# Patient Record
Sex: FEMALE | Race: WHITE | NOT HISPANIC OR LATINO | Employment: FULL TIME | URBAN - METROPOLITAN AREA
[De-identification: names, ages, dates, MRNs, and addresses within clinical notes are randomized per-mention and may not be internally consistent; named-entity substitution may affect disease eponyms.]

---

## 2017-07-14 ENCOUNTER — HOSPITAL ENCOUNTER (OUTPATIENT)
Dept: FAMILY MEDICINE CLINIC | Facility: CLINIC | Age: 42
Setting detail: SPECIMEN
Discharge: HOME | End: 2017-07-14
Attending: FAMILY MEDICINE | Admitting: FAMILY MEDICINE

## 2017-07-14 LAB
ALBUMIN SERPL-MCNC: 4.1 G/DL (ref 3.5–4.8)
ALBUMIN/GLOB SERPL: 1.6 {RATIO} (ref 1–1.7)
ALP SERPL-CCNC: 47 IU/L (ref 32–91)
ALT SERPL-CCNC: 15 IU/L (ref 14–54)
ANION GAP SERPL CALC-SCNC: 13.1 MMOL/L (ref 10–20)
AST SERPL-CCNC: 18 IU/L (ref 15–41)
BILIRUB SERPL-MCNC: 1.4 MG/DL (ref 0.3–1.2)
BUN SERPL-MCNC: 11 MG/DL (ref 8–20)
BUN/CREAT SERPL: 13.8 (ref 5.4–26.2)
CALCIUM SERPL-MCNC: 9 MG/DL (ref 8.9–10.3)
CHLORIDE SERPL-SCNC: 103 MMOL/L (ref 101–111)
CHOLEST SERPL-MCNC: 217 MG/DL
CHOLEST/HDLC SERPL: 2.5 {RATIO}
CONV CO2: 27 MMOL/L (ref 22–32)
CONV LDL CHOLESTEROL DIRECT: 121 MG/DL (ref 0–100)
CONV TOTAL PROTEIN: 6.7 G/DL (ref 6.1–7.9)
CREAT UR-MCNC: 0.8 MG/DL (ref 0.4–1)
GLOBULIN UR ELPH-MCNC: 2.6 G/DL (ref 2.5–3.8)
GLUCOSE SERPL-MCNC: 84 MG/DL (ref 65–99)
HDLC SERPL-MCNC: 88 MG/DL
LDLC/HDLC SERPL: 1.4 {RATIO}
LIPID INTERPRETATION: ABNORMAL
POTASSIUM SERPL-SCNC: 4.1 MMOL/L (ref 3.6–5.1)
SODIUM SERPL-SCNC: 139 MMOL/L (ref 136–144)
TRIGL SERPL-MCNC: 71 MG/DL
VLDLC SERPL CALC-MCNC: 7.9 MG/DL

## 2019-08-05 ENCOUNTER — OFFICE VISIT (OUTPATIENT)
Dept: FAMILY MEDICINE CLINIC | Facility: CLINIC | Age: 44
End: 2019-08-05

## 2019-08-05 VITALS
SYSTOLIC BLOOD PRESSURE: 109 MMHG | OXYGEN SATURATION: 100 % | DIASTOLIC BLOOD PRESSURE: 70 MMHG | BODY MASS INDEX: 21.97 KG/M2 | HEART RATE: 86 BPM | HEIGHT: 63 IN | WEIGHT: 124 LBS | TEMPERATURE: 97.6 F

## 2019-08-05 DIAGNOSIS — N91.5 OLIGOMENORRHEA, UNSPECIFIED TYPE: ICD-10-CM

## 2019-08-05 DIAGNOSIS — Z00.00 ENCOUNTER FOR GENERAL ADULT MEDICAL EXAMINATION WITHOUT ABNORMAL FINDINGS: Primary | ICD-10-CM

## 2019-08-05 DIAGNOSIS — Z12.39 SCREENING FOR BREAST CANCER: ICD-10-CM

## 2019-08-05 LAB
ALBUMIN SERPL-MCNC: 3.7 G/DL (ref 3.5–4.8)
ALBUMIN/GLOB SERPL: 1.5 G/DL (ref 1–1.7)
ALP SERPL-CCNC: 54 U/L (ref 32–91)
ALT SERPL W P-5'-P-CCNC: 12 U/L (ref 14–54)
ANION GAP SERPL CALCULATED.3IONS-SCNC: 10.9 MMOL/L (ref 5–15)
ARTICHOKE IGE QN: 136 MG/DL (ref 0–100)
AST SERPL-CCNC: 15 U/L (ref 15–41)
BASOPHILS # BLD AUTO: 0 10*3/MM3 (ref 0–0.2)
BASOPHILS NFR BLD AUTO: 0.4 % (ref 0–1.5)
BILIRUB SERPL-MCNC: 0.7 MG/DL (ref 0.3–1.2)
BUN BLD-MCNC: 14 MG/DL (ref 8–20)
BUN/CREAT SERPL: 20 (ref 5.4–26.2)
CALCIUM SPEC-SCNC: 8.6 MG/DL (ref 8.9–10.3)
CHLORIDE SERPL-SCNC: 108 MMOL/L (ref 101–111)
CHOLEST SERPL-MCNC: 215 MG/DL
CO2 SERPL-SCNC: 26 MMOL/L (ref 22–32)
CREAT BLD-MCNC: 0.7 MG/DL (ref 0.4–1)
DEPRECATED RDW RBC AUTO: 50.3 FL (ref 37–54)
EOSINOPHIL # BLD AUTO: 0.1 10*3/MM3 (ref 0–0.4)
EOSINOPHIL NFR BLD AUTO: 2.4 % (ref 0.3–6.2)
ERYTHROCYTE [DISTWIDTH] IN BLOOD BY AUTOMATED COUNT: 14.7 % (ref 12.3–15.4)
FSH SERPL-ACNC: 7.02 MIU/ML
GFR SERPL CREATININE-BSD FRML MDRD: 91 ML/MIN/1.73
GLOBULIN UR ELPH-MCNC: 2.4 GM/DL (ref 2.5–3.8)
GLUCOSE BLD-MCNC: 83 MG/DL (ref 65–99)
HCT VFR BLD AUTO: 35.8 % (ref 34–46.6)
HDLC SERPL QL: 2.95
HDLC SERPL-MCNC: 73 MG/DL
HGB BLD-MCNC: 12 G/DL (ref 12–15.9)
LDLC/HDLC SERPL: 1.77 {RATIO}
LYMPHOCYTES # BLD AUTO: 0.9 10*3/MM3 (ref 0.7–3.1)
LYMPHOCYTES NFR BLD AUTO: 20.7 % (ref 19.6–45.3)
MCH RBC QN AUTO: 31.9 PG (ref 26.6–33)
MCHC RBC AUTO-ENTMCNC: 33.4 G/DL (ref 31.5–35.7)
MCV RBC AUTO: 95.6 FL (ref 79–97)
MONOCYTES # BLD AUTO: 0.4 10*3/MM3 (ref 0.1–0.9)
MONOCYTES NFR BLD AUTO: 10 % (ref 5–12)
NEUTROPHILS # BLD AUTO: 3 10*3/MM3 (ref 1.7–7)
NEUTROPHILS NFR BLD AUTO: 66.5 % (ref 42.7–76)
NRBC BLD AUTO-RTO: 0.1 /100 WBC (ref 0–0.2)
PLATELET # BLD AUTO: 237 10*3/MM3 (ref 140–450)
PMV BLD AUTO: 7.7 FL (ref 6–12)
POTASSIUM BLD-SCNC: 4.9 MMOL/L (ref 3.6–5.1)
PROT SERPL-MCNC: 6.1 G/DL (ref 6.1–7.9)
RBC # BLD AUTO: 3.75 10*6/MM3 (ref 3.77–5.28)
SODIUM BLD-SCNC: 140 MMOL/L (ref 136–144)
TRIGL SERPL-MCNC: 64 MG/DL
TSH SERPL DL<=0.05 MIU/L-ACNC: 1.34 MIU/ML (ref 0.34–5.6)
VLDLC SERPL-MCNC: 12.8 MG/DL
WBC NRBC COR # BLD: 4.5 10*3/MM3 (ref 3.4–10.8)

## 2019-08-05 PROCEDURE — 80061 LIPID PANEL: CPT | Performed by: FAMILY MEDICINE

## 2019-08-05 PROCEDURE — 80053 COMPREHEN METABOLIC PANEL: CPT | Performed by: FAMILY MEDICINE

## 2019-08-05 PROCEDURE — 84443 ASSAY THYROID STIM HORMONE: CPT | Performed by: FAMILY MEDICINE

## 2019-08-05 PROCEDURE — 99396 PREV VISIT EST AGE 40-64: CPT | Performed by: FAMILY MEDICINE

## 2019-08-05 PROCEDURE — 83001 ASSAY OF GONADOTROPIN (FSH): CPT | Performed by: FAMILY MEDICINE

## 2019-08-05 PROCEDURE — 87624 HPV HI-RISK TYP POOLED RSLT: CPT | Performed by: FAMILY MEDICINE

## 2019-08-05 PROCEDURE — 85025 COMPLETE CBC W/AUTO DIFF WBC: CPT | Performed by: FAMILY MEDICINE

## 2019-08-05 PROCEDURE — G0148 SCR C/V CYTO, AUTOSYS, RESCR: HCPCS

## 2019-08-05 PROCEDURE — 36415 COLL VENOUS BLD VENIPUNCTURE: CPT | Performed by: FAMILY MEDICINE

## 2019-08-05 RX ORDER — VALACYCLOVIR HYDROCHLORIDE 500 MG/1
500 TABLET, FILM COATED ORAL DAILY
Refills: 1 | COMMUNITY
Start: 2019-06-21 | End: 2020-03-30 | Stop reason: SDUPTHER

## 2019-08-05 RX ORDER — VILAZODONE HYDROCHLORIDE 40 MG/1
TABLET ORAL
COMMUNITY
Start: 2018-08-17 | End: 2020-03-30 | Stop reason: SDUPTHER

## 2019-08-05 NOTE — PROGRESS NOTES
Subjective   Chief Complaint   Patient presents with   • Gynecologic Exam      CPE and fasting labs     Tanisha Mcmillan is a 43 y.o. female.     Gynecologic Exam   The patient's primary symptoms include vaginal bleeding. The patient's pertinent negatives include no missed menses. This is a new problem. The current episode started more than 1 month ago. She is not pregnant. Pertinent negatives include no abdominal pain, chills, constipation, diarrhea, discolored urine, dysuria, fever, frequency, hematuria, painful intercourse, rash or sore throat. Associated symptoms comments: Hot flashes, night sweats. The vaginal discharge was normal. Vaginal bleeding amount: spotting between periods, irregular menses. She has not been passing clots. She is sexually active. No, her partner does not have an STD. She uses nothing for contraception. Her menstrual history has been irregular. Her past medical history is significant for a  section, metrorrhagia and miscarriage. There is no history of ovarian cysts.      No past medical history on file.  No past surgical history on file.  Allergies   Allergen Reactions   • Cinnamon Unknown (See Comments)   • Latex Unknown (See Comments)     Social History     Socioeconomic History   • Marital status:      Spouse name: Not on file   • Number of children: Not on file   • Years of education: Not on file   • Highest education level: Not on file   Tobacco Use   • Smoking status: Never Smoker   • Smokeless tobacco: Never Used   Substance and Sexual Activity   • Alcohol use: Yes     Frequency: Monthly or less   Lifestyle   • Physical activity:     Days per week: 4 days     Minutes per session: 60 min   • Stress: Not on file     Social History     Tobacco Use   Smoking Status Never Smoker   Smokeless Tobacco Never Used       family history is not on file.  Current Outpatient Medications on File Prior to Visit   Medication Sig Dispense Refill   • CBD (cannabidiol) oral oil Take  by  "mouth.     • valACYclovir (VALTREX) 500 MG tablet Take 500 mg by mouth Daily.  1   • vilazodone (VIIBRYD) 40 MG tablet tablet VIIBRYD 40 MG TABS       No current facility-administered medications on file prior to visit.      Patient Active Problem List   Diagnosis   • Encounter for routine gynecological examination   • Encounter for general adult medical examination without abnormal findings   • Sleep apnea       The following portions of the patient's history were reviewed and updated as appropriate: allergies, current medications, past family history, past medical history, past social history, past surgical history and problem list.    Review of Systems   Constitutional: Negative for chills and fever.   HENT: Negative for sinus pressure and sore throat.    Eyes: Negative for blurred vision.   Respiratory: Negative for cough and shortness of breath.    Cardiovascular: Negative for chest pain and palpitations.   Gastrointestinal: Negative for abdominal pain, constipation and diarrhea.   Endocrine: Negative for polyuria.   Genitourinary: Negative for dysuria, frequency, hematuria and missed menses.   Skin: Negative for rash.   Neurological: Negative for dizziness and headache.   Hematological: Negative for adenopathy.   Psychiatric/Behavioral: Negative for depressed mood.       Objective   /70 (BP Location: Left arm, Patient Position: Sitting, Cuff Size: Adult)   Pulse 86   Temp 97.6 °F (36.4 °C) (Oral)   Ht 158.8 cm (62.5\")   Wt 56.2 kg (124 lb)   SpO2 100%   BMI 22.32 kg/m²   Physical Exam   Constitutional: She is oriented to person, place, and time. She appears well-developed. No distress.   HENT:   Head: Normocephalic.   Right Ear: Hearing, tympanic membrane and ear canal normal.   Left Ear: Hearing, tympanic membrane and ear canal normal.   Eyes: Conjunctivae, EOM and lids are normal. Pupils are equal, round, and reactive to light.   Neck: Normal range of motion. Neck supple. No thyroid mass and no " thyromegaly present.   Cardiovascular: Normal rate and regular rhythm.   Pulmonary/Chest: Effort normal and breath sounds normal.   Abdominal: Soft. Bowel sounds are normal. There is no tenderness.   Genitourinary: Vagina normal, uterus normal and cervix normal. Pelvic exam was performed with patient supine. Uterus is retroverted. Cervix does not exhibit motion tenderness. Right adnexum displays no mass and no tenderness. Left adnexum displays no mass and no tenderness.   Musculoskeletal: Normal range of motion.   Lymphadenopathy:     She has no cervical adenopathy.   Neurological: She is alert and oriented to person, place, and time.   Skin: Skin is warm and dry.   Psychiatric: She has a normal mood and affect. Her speech is normal.   Nursing note and vitals reviewed.      No visits with results within 1 Week(s) from this visit.   Latest known visit with results is:   Hospital Outpatient Visit on 07/14/2017   Component Date Value Ref Range Status   • Total Cholesterol 07/14/2017 217* <200 mg/dL Final   • Triglycerides 07/14/2017 71  <150 mg/dL Final   • HDL Cholesterol 07/14/2017 88  >39 mg/dL Final   • LDL Cholesterol  07/14/2017 121* 0 - 100 mg/dL Final   • VLDL Cholesterol 07/14/2017 7.9  <21 mg/dL Final   • LDL/HDL Ratio 07/14/2017 1.4   Final   • Chol/HDL Ratio 07/14/2017 2.5   Final    (SEE BELOW)  The following guidelines have been recommended by the NCEP for -    • Lipid Interpretation 07/14/2017 Total Cholesterol, Total Triglycerides, LDL Cholesterol,and HDL Cholesterol:    Final   • Lipid Interpretation 07/14/2017 TOTAL CHOLESTEROL                    HDL CHOLESTEROL  Desirable:              Final   • Lipid Interpretation 07/14/2017 <200 mg/dL     Low HDL:            <40 mg/dL  Borderline High:   200-239 mg/dL    Final   • Lipid Interpretation 07/14/2017    Normal:           40-60 mg/dL  High:           > or = 240 mg/dL       Final   • Lipid Interpretation 07/14/2017 Desirable:          >60 mg/dL  TOTAL  TRIGLYCERIDE                   LDL   Final   • Lipid Interpretation 07/14/2017 CHOLESTEROL  Normal:               <150 mg/dL     Optimal:           <100 mg/dL   Final   • Lipid Interpretation 07/14/2017  Borderline High:   150-199 mg/dL     Low Risk:       100-129 mg/dL  High:        Final   • Lipid Interpretation 07/14/2017         200-499 mg/dL     Borderline High:130-159 mg/dL  Very High:      > or =   Final   • Lipid Interpretation 07/14/2017 500 mg/dL     High:           160-189 mg/dL                                       Final   • Lipid Interpretation 07/14/2017   Very High:   > or = 190 mg/dL  The following ratios of LDL to HDL and Total   Final   • Lipid Interpretation 07/14/2017 Cholesterol to HDL are for information only:  LDL/HDL RATIO                       Final   • Lipid Interpretation 07/14/2017    TOTAL CHOLESTEROL/HDL RATIO  Desirable:              <5           Low Risk:    Final   • Lipid Interpretation 07/14/2017      3.3-4.4   Optimal:        < or = 3.5           Average Risk:   4.4-7.1       Final   • Lipid Interpretation 07/14/2017                                    Medium Risk:    7.1-11                         Final   • Lipid Interpretation 07/14/2017                   High Risk:         >11      Final   • Sodium 07/14/2017 139  136 - 144 mmol/L Final   • Potassium 07/14/2017 4.1  3.6 - 5.1 mmol/L Final   • Chloride 07/14/2017 103  101 - 111 mmol/L Final   • CO2 07/14/2017 27  22 - 32 mmol/L Final   • Glucose 07/14/2017 84  65 - 99 mg/dL Final   • BUN 07/14/2017 11  8 - 20 mg/dL Final   • Creatinine 07/14/2017 0.8  0.4 - 1.0 mg/dl Final   • Calcium 07/14/2017 9.0  8.9 - 10.3 mg/dL Final   • Total Protein 07/14/2017 6.7  6.1 - 7.9 g/dL Final   • Albumin 07/14/2017 4.1  3.5 - 4.8 g/dL Final   • Total Bilirubin 07/14/2017 1.4* 0.3 - 1.2 mg/dL Final   • Alkaline Phosphatase 07/14/2017 47  32 - 91 IU/L Final   • AST (SGOT) 07/14/2017 18  15 - 41 IU/L Final   • ALT (SGPT) 07/14/2017 15  14 - 54 IU/L  Final   • Anion Gap 07/14/2017 13.1  10 - 20 Final   • BUN/Creatinine Ratio 07/14/2017 13.8  5.4 - 26.2 Final   • GFR MDRD Non  07/14/2017 >60  >60 mL/min/1.73m2 Final   • GFR MDRD  07/14/2017 >60  >60 mL/min/1.73m2 Final   • Globulin 07/14/2017 2.6  2.5 - 3.8 G/dL Final   • A/G Ratio 07/14/2017 1.6  1.0 - 1.7 Final           Assessment/Plan   Problems Addressed this Visit        Genitourinary    Oligomenorrhea    Relevant Orders    Lipid Panel    Comprehensive Metabolic Panel    CBC & Differential    TSH    Follicle Stimulating Hormone       Other    Encounter for general adult medical examination without abnormal findings - Primary    Relevant Orders    Lipid Panel    Comprehensive Metabolic Panel    CBC & Differential    TSH    Follicle Stimulating Hormone    PapIG HPV Age Gdln ACOG    Screening for breast cancer    Relevant Orders    Mammo Screening Digital Tomosynthesis Bilateral With CAD          Tanisha was seen today for gynecologic exam.    Diagnoses and all orders for this visit:    Screening for breast cancer  -     Mammo Screening Digital Tomosynthesis Bilateral With CAD

## 2019-08-07 LAB
AGE GDLN ACOG TESTING: NORMAL
CHROM ANALY OVERALL INTERP-IMP: NORMAL
CONV .: NORMAL
CONV PERFORMED BY:: NORMAL
DX ICD CODE: NORMAL
HIV 1 & 2 AB SER-IMP: NORMAL
HPV I/H RISK 1 DNA CVX QL PROBE+SIG AMP: NORMAL
HPV I/H RISK 4 DNA CVX QL PROBE+SIG AMP: NEGATIVE
REF LAB TEST METHOD: NORMAL
STAT OF ADQ CVX/VAG CYTO-IMP: NORMAL

## 2020-03-30 RX ORDER — VALACYCLOVIR HYDROCHLORIDE 500 MG/1
500 TABLET, FILM COATED ORAL DAILY
Qty: 90 TABLET | Refills: 0 | Status: SHIPPED | OUTPATIENT
Start: 2020-03-30

## 2020-03-30 RX ORDER — VILAZODONE HYDROCHLORIDE 40 MG/1
TABLET ORAL
Qty: 90 TABLET | Refills: 0 | Status: SHIPPED | OUTPATIENT
Start: 2020-03-30 | End: 2020-08-17 | Stop reason: SDUPTHER

## 2020-08-16 RX ORDER — VILAZODONE HYDROCHLORIDE 40 MG/1
TABLET ORAL
Qty: 90 TABLET | Refills: 0 | OUTPATIENT
Start: 2020-08-16

## 2020-08-17 RX ORDER — VILAZODONE HYDROCHLORIDE 40 MG/1
TABLET ORAL
Qty: 90 TABLET | Refills: 0 | Status: SHIPPED | OUTPATIENT
Start: 2020-08-17 | End: 2021-02-05

## 2021-02-05 ENCOUNTER — OFFICE VISIT (OUTPATIENT)
Dept: FAMILY MEDICINE CLINIC | Facility: CLINIC | Age: 46
End: 2021-02-05

## 2021-02-05 VITALS
HEIGHT: 62 IN | TEMPERATURE: 98 F | DIASTOLIC BLOOD PRESSURE: 78 MMHG | OXYGEN SATURATION: 98 % | WEIGHT: 126 LBS | BODY MASS INDEX: 23.19 KG/M2 | SYSTOLIC BLOOD PRESSURE: 112 MMHG | HEART RATE: 100 BPM

## 2021-02-05 DIAGNOSIS — Z00.00 ENCOUNTER FOR GENERAL ADULT MEDICAL EXAMINATION WITHOUT ABNORMAL FINDINGS: Primary | ICD-10-CM

## 2021-02-05 DIAGNOSIS — Z12.31 ENCOUNTER FOR SCREENING MAMMOGRAM FOR MALIGNANT NEOPLASM OF BREAST: ICD-10-CM

## 2021-02-05 DIAGNOSIS — Z12.11 SCREEN FOR COLON CANCER: ICD-10-CM

## 2021-02-05 PROCEDURE — 99396 PREV VISIT EST AGE 40-64: CPT | Performed by: FAMILY MEDICINE

## 2021-02-05 RX ORDER — ESCITALOPRAM OXALATE 10 MG/1
10 TABLET ORAL DAILY
Qty: 90 TABLET | Refills: 1 | Status: SHIPPED | OUTPATIENT
Start: 2021-02-05 | End: 2022-10-14

## 2021-02-05 NOTE — PROGRESS NOTES
"Subjective   Tanisha Mcmillan is a 45 y.o. female and is here for a comprehensive physical exam. The patient reports problems - She has been on Viibryd for a while but it is expensive.  She has since gotten a dog and feels that this is helping with her emotions. .    Do you take any herbs or supplements that were not prescribed by a doctor? no  Are you taking calcium supplements? no  Are you taking aspirin daily? no    The following portions of the patient's history were reviewed and updated as appropriate: allergies, current medications, past family history, past medical history, past social history, past surgical history and problem list.    Review of Systems  Do you have pain that bothers you in your daily life? not asked  Pertinent items are noted in HPI.    Objective   /78 (BP Location: Left arm, Patient Position: Sitting, Cuff Size: Adult)   Pulse 100   Temp 98 °F (36.7 °C)   Ht 156.2 cm (61.5\")   Wt 57.2 kg (126 lb)   SpO2 98%   BMI 23.42 kg/m²   General appearance: alert, appears stated age and cooperative  Head: Normocephalic, without obvious abnormality, atraumatic  Eyes: conjunctivae/corneas clear. PERRL, EOM's intact. Fundi benign.  Ears: normal TM's and external ear canals both ears  Throat: lips, mucosa, and tongue normal; teeth and gums normal  Neck: no adenopathy, no JVD, supple, symmetrical, trachea midline and thyroid not enlarged, symmetric, no tenderness/mass/nodules  Lungs: clear to auscultation bilaterally  Heart: regular rate and rhythm, S1, S2 normal, no murmur, click, rub or gallop  Abdomen: soft, non-tender; bowel sounds normal; no masses,  no organomegaly  Extremities: extremities normal, atraumatic, no cyanosis or edema  Pulses: 2+ and symmetric  Skin: Skin color, texture, turgor normal. No rashes or lesions     No visits with results within 1 Week(s) from this visit.   Latest known visit with results is:   Office Visit on 08/05/2019   Component Date Value Ref Range Status   • " Total Cholesterol 08/05/2019 215* <=200 mg/dL Final   • Triglycerides 08/05/2019 64  <=150 mg/dL Final   • HDL Cholesterol 08/05/2019 73  >=39 mg/dL Final   • LDL Cholesterol  08/05/2019 136* 0 - 100 mg/dL Final   • VLDL Cholesterol 08/05/2019 12.8  mg/dL Final   • LDL/HDL Ratio 08/05/2019 1.77   Final   • Chol/HDL Ratio 08/05/2019 2.95   Final   • Glucose 08/05/2019 83  65 - 99 mg/dL Final   • BUN 08/05/2019 14  8 - 20 mg/dL Final   • Creatinine 08/05/2019 0.70  0.40 - 1.00 mg/dL Final   • Sodium 08/05/2019 140  136 - 144 mmol/L Final   • Potassium 08/05/2019 4.9  3.6 - 5.1 mmol/L Final   • Chloride 08/05/2019 108  101 - 111 mmol/L Final   • CO2 08/05/2019 26.0  22.0 - 32.0 mmol/L Final   • Calcium 08/05/2019 8.6* 8.9 - 10.3 mg/dL Final   • Total Protein 08/05/2019 6.1  6.1 - 7.9 g/dL Final   • Albumin 08/05/2019 3.70  3.50 - 4.80 g/dL Final   • ALT (SGPT) 08/05/2019 12* 14 - 54 U/L Final   • AST (SGOT) 08/05/2019 15  15 - 41 U/L Final   • Alkaline Phosphatase 08/05/2019 54  32 - 91 U/L Final   • Total Bilirubin 08/05/2019 0.7  0.3 - 1.2 mg/dL Final   • eGFR Non African Amer 08/05/2019 91  >60 mL/min/1.73 Final   • Globulin 08/05/2019 2.4* 2.5 - 3.8 gm/dL Final   • A/G Ratio 08/05/2019 1.5  1.0 - 1.7 g/dL Final   • BUN/Creatinine Ratio 08/05/2019 20.0  5.4 - 26.2 Final   • Anion Gap 08/05/2019 10.9  5.0 - 15.0 mmol/L Final   • TSH 08/05/2019 1.340  0.340 - 5.600 mIU/mL Final    Results may be falsely decreased if patient taking Biotin.   • FSH 08/05/2019 7.02  mIU/mL Final    Results may be falsely decreased if patient taking Biotin.   • Age Gdln ACOG Testing 08/05/2019 30-65   Final   • WBC 08/05/2019 4.50  3.40 - 10.80 10*3/mm3 Final   • RBC 08/05/2019 3.75* 3.77 - 5.28 10*6/mm3 Final   • Hemoglobin 08/05/2019 12.0  12.0 - 15.9 g/dL Final   • Hematocrit 08/05/2019 35.8  34.0 - 46.6 % Final   • MCV 08/05/2019 95.6  79.0 - 97.0 fL Final   • MCH 08/05/2019 31.9  26.6 - 33.0 pg Final   • MCHC 08/05/2019 33.4  31.5 -  35.7 g/dL Final   • RDW 08/05/2019 14.7  12.3 - 15.4 % Final   • RDW-SD 08/05/2019 50.3  37.0 - 54.0 fl Final   • MPV 08/05/2019 7.7  6.0 - 12.0 fL Final   • Platelets 08/05/2019 237  140 - 450 10*3/mm3 Final   • Neutrophil % 08/05/2019 66.5  42.7 - 76.0 % Final   • Lymphocyte % 08/05/2019 20.7  19.6 - 45.3 % Final   • Monocyte % 08/05/2019 10.0  5.0 - 12.0 % Final   • Eosinophil % 08/05/2019 2.4  0.3 - 6.2 % Final   • Basophil % 08/05/2019 0.4  0.0 - 1.5 % Final   • Neutrophils, Absolute 08/05/2019 3.00  1.70 - 7.00 10*3/mm3 Final   • Lymphocytes, Absolute 08/05/2019 0.90  0.70 - 3.10 10*3/mm3 Final   • Monocytes, Absolute 08/05/2019 0.40  0.10 - 0.90 10*3/mm3 Final   • Eosinophils, Absolute 08/05/2019 0.10  0.00 - 0.40 10*3/mm3 Final   • Basophils, Absolute 08/05/2019 0.00  0.00 - 0.20 10*3/mm3 Final   • nRBC 08/05/2019 0.1  0.0 - 0.2 /100 WBC Final   • Diagnosis 08/05/2019 Comment   Final    NEGATIVE FOR INTRAEPITHELIAL LESION OR MALIGNANCY.   • Specimen adequacy: 08/05/2019 Comment   Final    Satisfactory for evaluation.  Endocervical and/or squamous metaplastic  cells (endocervical component) are present.   • Clinician Provided ICD-10: 08/05/2019 Comment   Final    Z00.00   • Performed by: 08/05/2019 Comment   Final    Marquita العلي Cytotechnologist (ASCP)   • . 08/05/2019 .   Final   • Note: 08/05/2019 Comment   Final    The Pap smear is a screening test designed to aid in the detection of  premalignant and malignant conditions of the uterine cervix.  It is not a  diagnostic procedure and should not be used as the sole means of detecting  cervical cancer.  Both false-positive and false-negative reports do occur.   • Method: 08/05/2019 Comment   Final    This liquid based ThinPrep(R) pap test was screened with the  use of an image guided system.   • HPV, high-risk 08/05/2019    Final    The quantity of specimen remaining in the vial after Pap slide  preparation was less than the 4 mL minimum cell suspension  required.  Low sample cellularity may be the cause.  See HPV, low volume rfx  test result.  This high-risk HPV test detects thirteen high-risk types  (16/18/31/33/35/39/45/51/52/56/58/59/68) without differentiation.   • HPV, low volume rfx 08/05/2019 Negative  Negative Final    This test detects fourteen high-risk HPV types (16,18,31,33,35,39,45,  51,52,56,58,59,66,68) without differentiation.       Assessment/Plan   Healthy female exam.  Diagnoses and all orders for this visit:    1. Encounter for general adult medical examination without abnormal findings (Primary)  -     Comprehensive Metabolic Panel  -     Lipid Panel    2. Encounter for screening mammogram for malignant neoplasm of breast  -     Mammo Screening Digital Tomosynthesis Bilateral With CAD    3. Screen for colon cancer  -     Cologuard - Stool, Per Rectum; Future    Other orders  -     escitalopram (Lexapro) 10 MG tablet; Take 1 tablet by mouth Daily.  Dispense: 90 tablet; Refill: 1      1.Well exam.  2. Patient Counseling:  --Nutrition: Stressed importance of moderation in sodium/caffeine intake, saturated fat and cholesterol, caloric balance, sufficient intake of fresh fruits, vegetables, fiber, calcium, iron  --Exercise: Stressed the importance of regular exercise.   --Injury prevention: Discussed safety belts, safety helmets, smoke detector, smoking near bedding or upholstery.   --Dental health: Discussed importance of regular tooth brushing, flossing, and dental visits.  --Immunizations reviewed.  --Discussed benefits of screening colonoscopy.    3. Discussed the patient's BMI with her.  The BMI is in the acceptable range  4. Follow up in one year

## 2021-02-20 PROBLEM — Z12.11 SCREEN FOR COLON CANCER: Status: ACTIVE | Noted: 2021-02-20

## 2022-10-14 ENCOUNTER — LAB (OUTPATIENT)
Dept: FAMILY MEDICINE CLINIC | Facility: CLINIC | Age: 47
End: 2022-10-14

## 2022-10-14 ENCOUNTER — OFFICE VISIT (OUTPATIENT)
Dept: FAMILY MEDICINE CLINIC | Facility: CLINIC | Age: 47
End: 2022-10-14

## 2022-10-14 VITALS
SYSTOLIC BLOOD PRESSURE: 149 MMHG | OXYGEN SATURATION: 98 % | TEMPERATURE: 97.4 F | HEART RATE: 118 BPM | BODY MASS INDEX: 20.55 KG/M2 | HEIGHT: 63 IN | DIASTOLIC BLOOD PRESSURE: 87 MMHG | WEIGHT: 116 LBS

## 2022-10-14 DIAGNOSIS — Z00.00 ENCOUNTER FOR GENERAL ADULT MEDICAL EXAMINATION WITHOUT ABNORMAL FINDINGS: Primary | ICD-10-CM

## 2022-10-14 DIAGNOSIS — Z11.59 NEED FOR HEPATITIS C SCREENING TEST: ICD-10-CM

## 2022-10-14 DIAGNOSIS — Z00.00 ENCOUNTER FOR GENERAL ADULT MEDICAL EXAMINATION WITHOUT ABNORMAL FINDINGS: ICD-10-CM

## 2022-10-14 DIAGNOSIS — Z12.31 ENCOUNTER FOR SCREENING MAMMOGRAM FOR MALIGNANT NEOPLASM OF BREAST: ICD-10-CM

## 2022-10-14 LAB
ALBUMIN SERPL-MCNC: 4.9 G/DL (ref 3.5–5.2)
ALBUMIN/GLOB SERPL: 1.9 G/DL
ALP SERPL-CCNC: 54 U/L (ref 39–117)
ALT SERPL W P-5'-P-CCNC: 9 U/L (ref 1–33)
ANION GAP SERPL CALCULATED.3IONS-SCNC: 13 MMOL/L (ref 5–15)
AST SERPL-CCNC: 13 U/L (ref 1–32)
BASOPHILS # BLD AUTO: 0 10*3/MM3 (ref 0–0.2)
BASOPHILS NFR BLD AUTO: 0.6 % (ref 0–1.5)
BILIRUB SERPL-MCNC: 1 MG/DL (ref 0–1.2)
BUN SERPL-MCNC: 11 MG/DL (ref 6–20)
BUN/CREAT SERPL: 14.5 (ref 7–25)
CALCIUM SPEC-SCNC: 9.5 MG/DL (ref 8.6–10.5)
CHLORIDE SERPL-SCNC: 99 MMOL/L (ref 98–107)
CO2 SERPL-SCNC: 29 MMOL/L (ref 22–29)
CREAT SERPL-MCNC: 0.76 MG/DL (ref 0.57–1)
DEPRECATED RDW RBC AUTO: 45.9 FL (ref 37–54)
EGFRCR SERPLBLD CKD-EPI 2021: 97.4 ML/MIN/1.73
EOSINOPHIL # BLD AUTO: 0.1 10*3/MM3 (ref 0–0.4)
EOSINOPHIL NFR BLD AUTO: 1.1 % (ref 0.3–6.2)
ERYTHROCYTE [DISTWIDTH] IN BLOOD BY AUTOMATED COUNT: 13.9 % (ref 12.3–15.4)
GLOBULIN UR ELPH-MCNC: 2.6 GM/DL
GLUCOSE SERPL-MCNC: 81 MG/DL (ref 65–99)
HCT VFR BLD AUTO: 42.4 % (ref 34–46.6)
HCV AB SER DONR QL: NORMAL
HGB BLD-MCNC: 13.7 G/DL (ref 12–15.9)
LYMPHOCYTES # BLD AUTO: 1 10*3/MM3 (ref 0.7–3.1)
LYMPHOCYTES NFR BLD AUTO: 18.1 % (ref 19.6–45.3)
MCH RBC QN AUTO: 30.7 PG (ref 26.6–33)
MCHC RBC AUTO-ENTMCNC: 32.3 G/DL (ref 31.5–35.7)
MCV RBC AUTO: 95.2 FL (ref 79–97)
MONOCYTES # BLD AUTO: 0.4 10*3/MM3 (ref 0.1–0.9)
MONOCYTES NFR BLD AUTO: 6.9 % (ref 5–12)
NEUTROPHILS NFR BLD AUTO: 4 10*3/MM3 (ref 1.7–7)
NEUTROPHILS NFR BLD AUTO: 73.3 % (ref 42.7–76)
NRBC BLD AUTO-RTO: 0 /100 WBC (ref 0–0.2)
PLATELET # BLD AUTO: 320 10*3/MM3 (ref 140–450)
PMV BLD AUTO: 7.7 FL (ref 6–12)
POTASSIUM SERPL-SCNC: 4.1 MMOL/L (ref 3.5–5.2)
PROT SERPL-MCNC: 7.5 G/DL (ref 6–8.5)
RBC # BLD AUTO: 4.45 10*6/MM3 (ref 3.77–5.28)
SODIUM SERPL-SCNC: 141 MMOL/L (ref 136–145)
TSH SERPL DL<=0.05 MIU/L-ACNC: 0.86 UIU/ML (ref 0.27–4.2)
WBC NRBC COR # BLD: 5.5 10*3/MM3 (ref 3.4–10.8)

## 2022-10-14 PROCEDURE — 86803 HEPATITIS C AB TEST: CPT | Performed by: FAMILY MEDICINE

## 2022-10-14 PROCEDURE — 84443 ASSAY THYROID STIM HORMONE: CPT | Performed by: FAMILY MEDICINE

## 2022-10-14 PROCEDURE — 36415 COLL VENOUS BLD VENIPUNCTURE: CPT

## 2022-10-14 PROCEDURE — 85025 COMPLETE CBC W/AUTO DIFF WBC: CPT | Performed by: FAMILY MEDICINE

## 2022-10-14 PROCEDURE — 83002 ASSAY OF GONADOTROPIN (LH): CPT | Performed by: FAMILY MEDICINE

## 2022-10-14 PROCEDURE — 99396 PREV VISIT EST AGE 40-64: CPT | Performed by: FAMILY MEDICINE

## 2022-10-14 PROCEDURE — 80053 COMPREHEN METABOLIC PANEL: CPT | Performed by: FAMILY MEDICINE

## 2022-10-14 PROCEDURE — 83001 ASSAY OF GONADOTROPIN (FSH): CPT | Performed by: FAMILY MEDICINE

## 2022-10-14 RX ORDER — VILAZODONE HYDROCHLORIDE 20 MG/1
20 TABLET ORAL DAILY
Qty: 30 TABLET | Refills: 1 | Status: SHIPPED | OUTPATIENT
Start: 2022-10-14 | End: 2022-11-14

## 2022-10-14 RX ORDER — VILAZODONE HYDROCHLORIDE 20 MG/1
20 TABLET ORAL DAILY
Qty: 90 TABLET | OUTPATIENT
Start: 2022-10-14

## 2022-10-14 NOTE — PROGRESS NOTES
"Subjective   Tanisha Mcmillan is a 46 y.o. female and is here for a comprehensive physical exam. The patient reports problems - Increased anxiety recently.  She says she stopped Lexapro because she \"passed out\" twice while taking it.  She feels like her moods are extremely up or down.  Decreased appetite.  Not nauseated.  .She thinks she has lost 30 lbs over about 1 year.  She has had alterations in her taste and smell since having COVID in Sept 2021. Sexual intimacy concerns - had gone nearly a year without being physically intimate with her . LMP about 1 week ago.  Menstrual cycles are irregular.    Do you take any herbs or supplements that were not prescribed by a doctor? no  Are you taking calcium supplements? no  Are you taking aspirin daily? no    The following portions of the patient's history were reviewed and updated as appropriate: allergies, current medications, past family history, past medical history, past social history, past surgical history and problem list.    Review of Systems  Do you have pain that bothers you in your daily life? not asked  Pertinent items are noted in HPI.    Objective   /87 (BP Location: Left arm, Patient Position: Sitting, Cuff Size: Small Adult)   Pulse 118   Temp 97.4 °F (36.3 °C) (Infrared)   Ht 160 cm (63\")   Wt 52.6 kg (116 lb)   SpO2 98%   BMI 20.55 kg/m²     General Appearance:    Alert, cooperative, no distress, appears stated age   Head:    Normocephalic, without obvious abnormality, atraumatic   Eyes:    PERRL, conjunctiva/corneas clear, EOM's intact, fundi     benign, both eyes   Ears:    Normal TM's and external ear canals, both ears   Nose:   Nares normal, septum midline, mucosa normal, no drainage    or sinus tenderness   Throat:   Lips, mucosa, and tongue normal; teeth and gums normal   Neck:   Supple, symmetrical, trachea midline, no adenopathy;     thyroid:  no enlargement/tenderness/nodules; no carotid    bruit or JVD   Back:     Symmetric, " no curvature, ROM normal, no CVA tenderness   Lungs:     Clear to auscultation bilaterally, respirations unlabored   Chest Wall:    No tenderness or deformity    Heart:    Regular rate and rhythm, S1 and S2 normal, no murmur, rub   or gallop   Breast Exam:    No tenderness, masses, or nipple abnormality   Abdomen:     Soft, non-tender, bowel sounds active all four quadrants,     no masses, no organomegaly   Genitalia:    Normal female without lesion, discharge or tenderness   Rectal:    Normal tone, no masses or tenderness; guaiac negative stool   Extremities:   Extremities normal, atraumatic, no cyanosis or edema   Pulses:   2+ and symmetric all extremities   Skin:   Skin color, texture, turgor normal, no rashes or lesions   Lymph nodes:   Cervical, supraclavicular, and axillary nodes normal   Neurologic:   CNII-XII intact, normal strength, sensation and reflexes     throughout        Office Visit on 10/14/2022   Component Date Value Ref Range Status   • Glucose 10/14/2022 81  65 - 99 mg/dL Final   • BUN 10/14/2022 11  6 - 20 mg/dL Final   • Creatinine 10/14/2022 0.76  0.57 - 1.00 mg/dL Final   • Sodium 10/14/2022 141  136 - 145 mmol/L Final   • Potassium 10/14/2022 4.1  3.5 - 5.2 mmol/L Final   • Chloride 10/14/2022 99  98 - 107 mmol/L Final   • CO2 10/14/2022 29.0  22.0 - 29.0 mmol/L Final   • Calcium 10/14/2022 9.5  8.6 - 10.5 mg/dL Final   • Total Protein 10/14/2022 7.5  6.0 - 8.5 g/dL Final   • Albumin 10/14/2022 4.90  3.50 - 5.20 g/dL Final   • ALT (SGPT) 10/14/2022 9  1 - 33 U/L Final   • AST (SGOT) 10/14/2022 13  1 - 32 U/L Final   • Alkaline Phosphatase 10/14/2022 54  39 - 117 U/L Final   • Total Bilirubin 10/14/2022 1.0  0.0 - 1.2 mg/dL Final   • Globulin 10/14/2022 2.6  gm/dL Final   • A/G Ratio 10/14/2022 1.9  g/dL Final   • BUN/Creatinine Ratio 10/14/2022 14.5  7.0 - 25.0 Final   • Anion Gap 10/14/2022 13.0  5.0 - 15.0 mmol/L Final   • eGFR 10/14/2022 97.4  >60.0 mL/min/1.73 Final    National Kidney  Foundation and American Society of Nephrology (ASN) Task Force recommended calculation based on the Chronic Kidney Disease Epidemiology Collaboration (CKD-EPI) equation refit without adjustment for race.   • TSH 10/14/2022 0.865  0.270 - 4.200 uIU/mL Final   • Age Gdln ACOG Testing 10/14/2022 30-65   Final   • WBC 10/14/2022 5.50  3.40 - 10.80 10*3/mm3 Final   • RBC 10/14/2022 4.45  3.77 - 5.28 10*6/mm3 Final   • Hemoglobin 10/14/2022 13.7  12.0 - 15.9 g/dL Final   • Hematocrit 10/14/2022 42.4  34.0 - 46.6 % Final   • MCV 10/14/2022 95.2  79.0 - 97.0 fL Final   • MCH 10/14/2022 30.7  26.6 - 33.0 pg Final   • MCHC 10/14/2022 32.3  31.5 - 35.7 g/dL Final   • RDW 10/14/2022 13.9  12.3 - 15.4 % Final   • RDW-SD 10/14/2022 45.9  37.0 - 54.0 fl Final   • MPV 10/14/2022 7.7  6.0 - 12.0 fL Final   • Platelets 10/14/2022 320  140 - 450 10*3/mm3 Final   • Neutrophil % 10/14/2022 73.3  42.7 - 76.0 % Final   • Lymphocyte % 10/14/2022 18.1 (L)  19.6 - 45.3 % Final   • Monocyte % 10/14/2022 6.9  5.0 - 12.0 % Final   • Eosinophil % 10/14/2022 1.1  0.3 - 6.2 % Final   • Basophil % 10/14/2022 0.6  0.0 - 1.5 % Final   • Neutrophils, Absolute 10/14/2022 4.00  1.70 - 7.00 10*3/mm3 Final   • Lymphocytes, Absolute 10/14/2022 1.00  0.70 - 3.10 10*3/mm3 Final   • Monocytes, Absolute 10/14/2022 0.40  0.10 - 0.90 10*3/mm3 Final   • Eosinophils, Absolute 10/14/2022 0.10  0.00 - 0.40 10*3/mm3 Final   • Basophils, Absolute 10/14/2022 0.00  0.00 - 0.20 10*3/mm3 Final   • nRBC 10/14/2022 0.0  0.0 - 0.2 /100 WBC Final   • Diagnosis 10/14/2022 Comment   Final    NEGATIVE FOR INTRAEPITHELIAL LESION OR MALIGNANCY.   • Specimen adequacy: 10/14/2022 Comment   Final    Satisfactory for evaluation.  Endocervical and/or squamous metaplastic  cells (endocervical component) are present.   • Clinician Provided ICD-10: 10/14/2022 Comment   Final    Z00.00   • Performed by: 10/14/2022 Comment   Final    Ashwini Yusuf, Cytotechnologist (ASCP)   • .  10/14/2022 .   Final   • Note: 10/14/2022 Comment   Final    The Pap smear is a screening test designed to aid in the detection of  premalignant and malignant conditions of the uterine cervix.  It is not a  diagnostic procedure and should not be used as the sole means of detecting  cervical cancer.  Both false-positive and false-negative reports do occur.   • Method: 10/14/2022 Comment   Final    This liquid based ThinPrep(R) pap test was screened with the  use of an image guided system.   • HPV Aptima 10/14/2022 Negative  Negative Final    This nucleic acid amplification test detects fourteen high-risk  HPV types (16,18,31,33,35,39,45,51,52,56,58,59,66,68) without  differentiation.   Lab on 10/14/2022   Component Date Value Ref Range Status   • FSH 10/14/2022 17.80  mIU/mL Final   • LH 10/14/2022 9.82  mIU/mL Final   • Hepatitis C Ab 10/14/2022 Non-Reactive  Non-Reactive Final       Assessment & Plan   Healthy female exam.    Diagnoses and all orders for this visit:    1. Encounter for general adult medical examination without abnormal findings (Primary)  -     IGP,Aptima HPV,Age Gdln; Future  -     CBC & Differential  -     Comprehensive Metabolic Panel  -     TSH  -     FSH & LH; Future  -     IGP,Aptima HPV,Age Gdln  -     IGP, Aptima HPV, Rfx 16 / 18,45  -     PDF Report    2. Need for hepatitis C screening test  -     Hepatitis C Antibody; Future    3. Encounter for screening mammogram for malignant neoplasm of breast  -     Mammo Screening Digital Tomosynthesis Bilateral With CAD    Other orders  -     vilazodone (Viibryd) 20 MG tablet tablet; Take 1 tablet by mouth Daily.  Dispense: 30 tablet; Refill: 1      1. Well exam  2. Patient Counseling:  --Nutrition: Stressed importance of moderation in sodium/caffeine intake, saturated fat and cholesterol, caloric balance, sufficient intake of fresh fruits, vegetables, fiber, calcium, iron, and 1 mg of folate supplement per day (for females capable of  pregnancy).  --Discussed the issue of estrogen replacement, calcium supplement, and the daily use of baby aspirin.  --Exercise: Stressed the importance of regular exercise.    --Dental health: Discussed importance of regular tooth brushing, flossing, and dental visits.  --Immunizations reviewed.  --Discussed benefits of screening colonoscopy.  --After hours service discussed with patient    3. Discussed the patient's BMI with her.  The BMI is in the acceptable range  4. Follow up in one year           Answers for HPI/ROS submitted by the patient on 10/11/2022  Please describe your symptoms.: Extremely up & down moods. Sad. Crying. Rage/temper triggers. Trouble stayinh asleep. Wake up heart racing & worrying/guilt fears. Depressed. Anxiety, + Menstrual Cycle no normal anymore. Heavy, light, few day to almost 2weeks long (been doing this for about a year). Comes in 20days or maybe 35days?!  Have you had these symptoms before?: Yes  How long have you been having these symptoms?: Greater than 2 weeks  Please list any medications you are currently taking for this condition.: Unisom(PRN). Aleve(PRN)  What is the primary reason for your visit?: Other       English

## 2022-10-15 LAB
FSH SERPL-ACNC: 17.8 MIU/ML
LH SERPL-ACNC: 9.82 MIU/ML

## 2022-10-19 ENCOUNTER — PATIENT MESSAGE (OUTPATIENT)
Dept: FAMILY MEDICINE CLINIC | Facility: CLINIC | Age: 47
End: 2022-10-19

## 2022-10-20 LAB
AGE GDLN ACOG TESTING: NORMAL
CYTOLOGIST CVX/VAG CYTO: NORMAL
CYTOLOGY CVX/VAG DOC CYTO: NORMAL
CYTOLOGY CVX/VAG DOC THIN PREP: NORMAL
DX ICD CODE: NORMAL
HIV 1 & 2 AB SER-IMP: NORMAL
HPV I/H RISK 4 DNA CVX QL PROBE+SIG AMP: NEGATIVE
OTHER STN SPEC: NORMAL
STAT OF ADQ CVX/VAG CYTO-IMP: NORMAL

## 2022-10-21 RX ORDER — NORETHINDRONE ACETATE AND ETHINYL ESTRADIOL, ETHINYL ESTRADIOL AND FERROUS FUMARATE 1MG-10(24)
1 KIT ORAL DAILY
Qty: 28 TABLET | Refills: 5 | Status: SHIPPED | OUTPATIENT
Start: 2022-10-21

## 2022-10-21 NOTE — PROGRESS NOTES
Pt was verbally notified and asked what you thought about her being on BC since these are normal and she is having these issues with her menstrual cycle, she stated you discuss this but she is interested in taking a BC

## 2022-11-14 RX ORDER — VILAZODONE HYDROCHLORIDE 20 MG/1
20 TABLET ORAL DAILY
Qty: 90 TABLET | Refills: 1 | Status: SHIPPED | OUTPATIENT
Start: 2022-11-14

## 2023-04-12 RX ORDER — NORETHINDRONE ACETATE AND ETHINYL ESTRADIOL, ETHINYL ESTRADIOL AND FERROUS FUMARATE 1MG-10(24)
1 KIT ORAL DAILY
Qty: 28 TABLET | Refills: 5 | Status: SHIPPED | OUTPATIENT
Start: 2023-04-12

## 2023-06-13 ENCOUNTER — TELEPHONE (OUTPATIENT)
Dept: FAMILY MEDICINE CLINIC | Facility: CLINIC | Age: 48
End: 2023-06-13
Payer: COMMERCIAL

## 2023-06-13 DIAGNOSIS — Z12.31 ENCOUNTER FOR SCREENING MAMMOGRAM FOR MALIGNANT NEOPLASM OF BREAST: Primary | ICD-10-CM

## 2023-06-13 RX ORDER — NORETHINDRONE ACETATE AND ETHINYL ESTRADIOL 1MG-20(21)
1 KIT ORAL DAILY
Qty: 28 TABLET | Refills: 6 | Status: SHIPPED | OUTPATIENT
Start: 2023-06-13 | End: 2023-06-14

## 2023-06-13 NOTE — TELEPHONE ENCOUNTER
PATIENT CALLED AND STATES HER MEDICATION      Lo Loestrin Fe 1 MG-10 MCG / 10 MCG tablet     WILL NOT BE COVERED ANY LONGER BY HER INSURANCE. IT WILL COST 172.00 . IS THERE ANYTHING ELSE SHE CAN TAKE THAT IS COMPARABLE.     SHE IS ON HER LAST WEEK OF MEDICATION   (DUD TABLETS)    [id number] [place], [place] - [address] AT [facility] [place] [address] [address] - [phone] [facility] [phone]   [phone]    PLEASE CALL AND ADVISE [phone]  A MESSAGE CAN BE LEFT AND SHE CAN CALL BACK    HER INSURANCE WILL CHANGE TO ANTHEM 7/1/23

## 2023-06-13 NOTE — TELEPHONE ENCOUNTER
I sent in a different birth control pill.   When was her last mammogram?  It looks like she is overdue.

## 2023-06-14 RX ORDER — NORETHINDRONE ACETATE AND ETHINYL ESTRADIOL 1MG-20(21)
KIT ORAL
Qty: 84 TABLET | Refills: 1 | Status: SHIPPED | OUTPATIENT
Start: 2023-06-14

## 2023-06-16 RX ORDER — VILAZODONE HYDROCHLORIDE 20 MG/1
20 TABLET ORAL DAILY
Qty: 90 TABLET | Refills: 1 | Status: SHIPPED | OUTPATIENT
Start: 2023-06-16

## 2023-07-29 ENCOUNTER — APPOINTMENT (OUTPATIENT)
Dept: GENERAL RADIOLOGY | Facility: HOSPITAL | Age: 48
End: 2023-07-29
Payer: COMMERCIAL

## 2023-07-29 ENCOUNTER — HOSPITAL ENCOUNTER (EMERGENCY)
Facility: HOSPITAL | Age: 48
Discharge: HOME | End: 2023-07-30
Attending: EMERGENCY MEDICINE | Admitting: EMERGENCY MEDICINE
Payer: COMMERCIAL

## 2023-07-29 DIAGNOSIS — S61.219A COMPLICATED LACERATION OF FINGER, INITIAL ENCOUNTER: Primary | ICD-10-CM

## 2023-07-29 PROCEDURE — 73140 X-RAY EXAM OF FINGER(S): CPT

## 2023-07-29 PROCEDURE — 99283 EMERGENCY DEPT VISIT LOW MDM: CPT

## 2023-07-29 RX ORDER — ACETAMINOPHEN 325 MG/1
650 TABLET ORAL ONCE
Status: COMPLETED | OUTPATIENT
Start: 2023-07-30 | End: 2023-07-29

## 2023-07-29 RX ORDER — LIDOCAINE HYDROCHLORIDE 10 MG/ML
10 INJECTION, SOLUTION EPIDURAL; INFILTRATION; INTRACAUDAL; PERINEURAL ONCE
Status: COMPLETED | OUTPATIENT
Start: 2023-07-30 | End: 2023-07-29

## 2023-07-29 RX ADMIN — LIDOCAINE HYDROCHLORIDE 10 ML: 10 INJECTION, SOLUTION EPIDURAL; INFILTRATION; INTRACAUDAL; PERINEURAL at 23:41

## 2023-07-29 RX ADMIN — ACETAMINOPHEN 650 MG: 325 TABLET, FILM COATED ORAL at 23:41

## 2023-07-30 VITALS
HEIGHT: 62 IN | SYSTOLIC BLOOD PRESSURE: 136 MMHG | OXYGEN SATURATION: 99 % | DIASTOLIC BLOOD PRESSURE: 84 MMHG | HEART RATE: 88 BPM | BODY MASS INDEX: 22.08 KG/M2 | WEIGHT: 120 LBS | RESPIRATION RATE: 20 BRPM | TEMPERATURE: 98 F

## 2023-07-30 PROCEDURE — 25010000002 TETANUS-DIPHTH-ACELL PERTUSSIS 5-2.5-18.5 LF-MCG/0.5 SUSPENSION PREFILLED SYRINGE: Performed by: EMERGENCY MEDICINE

## 2023-07-30 PROCEDURE — 90715 TDAP VACCINE 7 YRS/> IM: CPT | Performed by: EMERGENCY MEDICINE

## 2023-07-30 PROCEDURE — 90471 IMMUNIZATION ADMIN: CPT | Performed by: EMERGENCY MEDICINE

## 2023-07-30 RX ORDER — CEPHALEXIN 500 MG/1
500 CAPSULE ORAL ONCE
Status: COMPLETED | OUTPATIENT
Start: 2023-07-30 | End: 2023-07-30

## 2023-07-30 RX ORDER — CEPHALEXIN 500 MG/1
500 CAPSULE ORAL 4 TIMES DAILY
Qty: 28 CAPSULE | Refills: 0 | Status: SHIPPED | OUTPATIENT
Start: 2023-07-30 | End: 2023-08-06

## 2023-07-30 RX ORDER — IBUPROFEN 600 MG/1
600 TABLET ORAL EVERY 8 HOURS PRN
Qty: 15 TABLET | Refills: 0 | Status: SHIPPED | OUTPATIENT
Start: 2023-07-30

## 2023-07-30 RX ADMIN — TETANUS TOXOID, REDUCED DIPHTHERIA TOXOID AND ACELLULAR PERTUSSIS VACCINE, ADSORBED 0.5 ML: 5; 2.5; 8; 8; 2.5 SUSPENSION INTRAMUSCULAR at 02:26

## 2023-07-30 RX ADMIN — CEPHALEXIN 500 MG: 500 CAPSULE ORAL at 02:26

## 2023-09-08 NOTE — TELEPHONE ENCOUNTER
"    Caller: Tanisha Mcmillan \"Casie\"    Relationship: Self    Best call back number: [phone]     Requested Prescriptions:   Requested Prescriptions     Pending Prescriptions Disp Refills    norethindrone-ethinyl estradiol FE (Blisovi FE 1/20) 1-20 MG-MCG per tablet 84 tablet 1     Sig: Take 1 tablet by mouth Daily.        Pharmacy where request should be sent: [id number] [place] [address] [address]  AT [address] [phone] [facility] [phone]      Last office visit with prescribing clinician: 10/14/2022   Last telemedicine visit with prescribing clinician: Visit date not found   Next office visit with prescribing clinician: Visit date not found     Additional details provided by patient:     Does the patient have less than a 3 day supply:  [x] Yes  [] No    Would you like a call back once the refill request has been completed: [] Yes [] No    If the office needs to give you a call back, can they leave a voicemail: [] Yes [] No    Zane Simms Rep   09/08/23 12:39 EDT         "

## 2023-09-11 RX ORDER — NORETHINDRONE ACETATE AND ETHINYL ESTRADIOL 1MG-20(21)
1 KIT ORAL DAILY
Qty: 84 TABLET | Refills: 1 | Status: SHIPPED | OUTPATIENT
Start: 2023-09-11

## 2023-09-15 RX ORDER — VILAZODONE HYDROCHLORIDE 20 MG/1
20 TABLET ORAL DAILY
Qty: 90 TABLET | Refills: 1 | Status: SHIPPED | OUTPATIENT
Start: 2023-09-15

## 2024-03-22 RX ORDER — VILAZODONE HYDROCHLORIDE 20 MG/1
TABLET ORAL
Qty: 90 TABLET | Refills: 1 | Status: SHIPPED | OUTPATIENT
Start: 2024-03-22

## 2024-03-22 NOTE — TELEPHONE ENCOUNTER
I refilled her medicine but she has not been here in a long time.  She needs to schedule an appointment for a wellness visit with me.

## 2024-04-23 RX ORDER — VILAZODONE HYDROCHLORIDE 20 MG/1
20 TABLET ORAL DAILY
Qty: 90 TABLET | Refills: 0 | Status: SHIPPED | OUTPATIENT
Start: 2024-04-23

## 2024-04-23 NOTE — TELEPHONE ENCOUNTER
"  Caller: Tanisha Mcmillan \"Casie\"    Relationship: Self    Best call back number: [phone]     Requested Prescriptions:   Requested Prescriptions     Pending Prescriptions Disp Refills    vilazodone (VIIBRYD) 20 MG tablet tablet 90 tablet 1        Pharmacy where request should be sent: [facility] [id number] - [phone]  - [phone] FX     Last office visit with prescribing clinician: 10/14/2022   Last telemedicine visit with prescribing clinician: Visit date not found   Next office visit with prescribing clinician: 5/24/2024     Additional details provided by patient:     Does the patient have less than a 3 day supply:  [x] Yes  [] No    Would you like a call back once the refill request has been completed: [x] Yes [] No    If the office needs to give you a call back, can they leave a voicemail: [] Yes [] No    Zane Vickers Rep   04/23/24 11:51 EDT         "

## 2024-05-29 ENCOUNTER — TELEPHONE (OUTPATIENT)
Dept: FAMILY MEDICINE CLINIC | Facility: CLINIC | Age: 49
End: 2024-05-29

## 2024-05-29 NOTE — TELEPHONE ENCOUNTER
"Caller: Tanisha Mcmillan \"Casie\"    Relationship: Self    Best call back number: [phone]    Which medication are you concerned about:     vilazodone (VIIBRYD) 20 MG tablet tablet  20 mg, Daily       Who prescribed you this medication: DR. ESCAMILLA     When did you start taking this medication: N/A    What are your concerns: PATIENT CALLED AND SAID SHE IS OUT OF THIS MEDICATION AND STARTING TO EXPERIENCE THE EFFECTS OF HAVING NOT TAKEN IT    SHE IS WANTING TO SEE IF THERE IS A WAY SHE CAN GET THIS REFILLED BEFORE HER NEXT APPOINTMENT    How long have you had these concerns: N/A    "

## 2024-05-31 RX ORDER — VILAZODONE HYDROCHLORIDE 20 MG/1
20 TABLET ORAL DAILY
Qty: 90 TABLET | Refills: 0 | Status: SHIPPED | OUTPATIENT
Start: 2024-05-31

## 2024-06-01 RX ORDER — VALACYCLOVIR HYDROCHLORIDE 500 MG/1
500 TABLET, FILM COATED ORAL DAILY
Qty: 90 TABLET | Refills: 0 | OUTPATIENT
Start: 2024-06-01

## 2024-06-14 ENCOUNTER — OFFICE VISIT (OUTPATIENT)
Dept: FAMILY MEDICINE CLINIC | Facility: CLINIC | Age: 49
End: 2024-06-14
Payer: COMMERCIAL

## 2024-06-14 ENCOUNTER — LAB (OUTPATIENT)
Dept: FAMILY MEDICINE CLINIC | Facility: CLINIC | Age: 49
End: 2024-06-14
Payer: COMMERCIAL

## 2024-06-14 VITALS
WEIGHT: 126.4 LBS | TEMPERATURE: 98.7 F | HEART RATE: 92 BPM | DIASTOLIC BLOOD PRESSURE: 83 MMHG | HEIGHT: 62 IN | SYSTOLIC BLOOD PRESSURE: 147 MMHG | OXYGEN SATURATION: 97 % | BODY MASS INDEX: 23.26 KG/M2

## 2024-06-14 DIAGNOSIS — Z00.00 ENCOUNTER FOR GENERAL ADULT MEDICAL EXAMINATION WITHOUT ABNORMAL FINDINGS: Primary | ICD-10-CM

## 2024-06-14 DIAGNOSIS — Z12.31 ENCOUNTER FOR SCREENING MAMMOGRAM FOR MALIGNANT NEOPLASM OF BREAST: ICD-10-CM

## 2024-06-14 PROCEDURE — 80053 COMPREHEN METABOLIC PANEL: CPT | Performed by: FAMILY MEDICINE

## 2024-06-14 PROCEDURE — 36415 COLL VENOUS BLD VENIPUNCTURE: CPT | Performed by: FAMILY MEDICINE

## 2024-06-14 PROCEDURE — 99396 PREV VISIT EST AGE 40-64: CPT | Performed by: FAMILY MEDICINE

## 2024-06-14 PROCEDURE — 80061 LIPID PANEL: CPT | Performed by: FAMILY MEDICINE

## 2024-06-14 NOTE — PROGRESS NOTES
"Subjective   Tanisha Mcmillan is a 48 y.o. female and is here for a comprehensive physical exam. The patient reports problems - Increased stress recently, son getting , lost a close friend, lost her boat, etc.  .She had a biometric screen done for work about 6 months ago with labs that showed elevated cholesterol.    Do you take any herbs or supplements that were not prescribed by a doctor? no  Are you taking calcium supplements? no  Are you taking aspirin daily? no    The following portions of the patient's history were reviewed and updated as appropriate: allergies, current medications, past family history, past medical history, past social history, past surgical history, and problem list.    Review of Systems  Do you have pain that bothers you in your daily life? not asked  Pertinent items are noted in HPI.    Objective   /83 (BP Location: Left arm, Patient Position: Sitting, Cuff Size: Adult)   Pulse 92   Temp 98.7 °F (37.1 °C) (Infrared)   Ht 157.5 cm (62\")   Wt 57.3 kg (126 lb 6.4 oz)   SpO2 97%   BMI 23.12 kg/m²   General appearance: alert, appears stated age, and cooperative  Head: Normocephalic, without obvious abnormality, atraumatic  Eyes: conjunctivae/corneas clear. PERRL, EOM's intact. Fundi benign.  Ears: normal TM's and external ear canals both ears  Throat: lips, mucosa, and tongue normal; teeth and gums normal  Neck: no adenopathy, no JVD, supple, symmetrical, trachea midline, and thyroid not enlarged, symmetric, no tenderness/mass/nodules  Lungs: clear to auscultation bilaterally  Heart: regular rate and rhythm, S1, S2 normal, no murmur, click, rub or gallop  Abdomen: soft, non-tender; bowel sounds normal; no masses,  no organomegaly  Extremities: extremities normal, atraumatic, no cyanosis or edema  Skin: Skin color, texture, turgor normal. No rashes or lesions  Lymph nodes: Cervical, supraclavicular, and axillary nodes normal.  Neurologic: Grossly normal     Office Visit on " 06/14/2024   Component Date Value Ref Range Status    Total Cholesterol 06/14/2024 273 (H)  0 - 200 mg/dL Final    Triglycerides 06/14/2024 56  0 - 150 mg/dL Final    HDL Cholesterol 06/14/2024 122 (H)  40 - 60 mg/dL Final    LDL Cholesterol  06/14/2024 143 (H)  0 - 100 mg/dL Final    VLDL Cholesterol 06/14/2024 8  5 - 40 mg/dL Final    LDL/HDL Ratio 06/14/2024 1.15   Final    Glucose 06/14/2024 65  65 - 99 mg/dL Final    BUN 06/14/2024 10  6 - 20 mg/dL Final    Creatinine 06/14/2024 0.76  0.57 - 1.00 mg/dL Final    Sodium 06/14/2024 140  136 - 145 mmol/L Final    Potassium 06/14/2024 3.7  3.5 - 5.2 mmol/L Final    Chloride 06/14/2024 102  98 - 107 mmol/L Final    CO2 06/14/2024 23.1  22.0 - 29.0 mmol/L Final    Calcium 06/14/2024 9.5  8.6 - 10.5 mg/dL Final    Total Protein 06/14/2024 7.8  6.0 - 8.5 g/dL Final    Albumin 06/14/2024 4.7  3.5 - 5.2 g/dL Final    ALT (SGPT) 06/14/2024 12  1 - 33 U/L Final    AST (SGOT) 06/14/2024 15  1 - 32 U/L Final    Alkaline Phosphatase 06/14/2024 61  39 - 117 U/L Final    Total Bilirubin 06/14/2024 0.9  0.0 - 1.2 mg/dL Final    Globulin 06/14/2024 3.1  gm/dL Final    A/G Ratio 06/14/2024 1.5  g/dL Final    BUN/Creatinine Ratio 06/14/2024 13.2  7.0 - 25.0 Final    Anion Gap 06/14/2024 14.9  5.0 - 15.0 mmol/L Final    eGFR 06/14/2024 96.2  >60.0 mL/min/1.73 Final       Assessment & Plan   Healthy female exam.   Diagnoses and all orders for this visit:    1. Encounter for general adult medical examination without abnormal findings (Primary)  -     Lipid Panel  -     Comprehensive Metabolic Panel    2. Encounter for screening mammogram for malignant neoplasm of breast  -     Mammo Screening Digital Tomosynthesis Bilateral With CAD      1. Well exam.   2. Patient Counseling:  --Nutrition: Stressed importance of moderation in sodium/caffeine intake, saturated fat and cholesterol, caloric balance, sufficient intake of fresh fruits, vegetables, fiber, calcium, iron  --Exercise: Stressed  the importance of regular exercise.    --Dental health: Discussed importance of regular tooth brushing, flossing, and dental visits.  --Immunizations reviewed.  --Discussed benefits of screening colonoscopy.    3. Discussed the patient's BMI with her.  The BMI is in the acceptable range  4. Follow up in one year             Answers submitted by the patient for this visit:  Other (Submitted on 6/14/2024)  Please describe your symptoms.: Anxiety.. Bad Mood Changes. , Yearly Exam  Have you had these symptoms before?: Yes  How long have you been having these symptoms?: Greater than 2 weeks  Please list any medications you are currently taking for this condition.: Vilazodone 20mg  Please describe any probable cause for these symptoms. : Life  Primary Reason for Visit (Submitted on 6/14/2024)  What is the primary reason for your visit?: Other

## 2024-06-15 LAB
ALBUMIN SERPL-MCNC: 4.7 G/DL (ref 3.5–5.2)
ALBUMIN/GLOB SERPL: 1.5 G/DL
ALP SERPL-CCNC: 61 U/L (ref 39–117)
ALT SERPL W P-5'-P-CCNC: 12 U/L (ref 1–33)
ANION GAP SERPL CALCULATED.3IONS-SCNC: 14.9 MMOL/L (ref 5–15)
AST SERPL-CCNC: 15 U/L (ref 1–32)
BILIRUB SERPL-MCNC: 0.9 MG/DL (ref 0–1.2)
BUN SERPL-MCNC: 10 MG/DL (ref 6–20)
BUN/CREAT SERPL: 13.2 (ref 7–25)
CALCIUM SPEC-SCNC: 9.5 MG/DL (ref 8.6–10.5)
CHLORIDE SERPL-SCNC: 102 MMOL/L (ref 98–107)
CHOLEST SERPL-MCNC: 273 MG/DL (ref 0–200)
CO2 SERPL-SCNC: 23.1 MMOL/L (ref 22–29)
CREAT SERPL-MCNC: 0.76 MG/DL (ref 0.57–1)
EGFRCR SERPLBLD CKD-EPI 2021: 96.2 ML/MIN/1.73
GLOBULIN UR ELPH-MCNC: 3.1 GM/DL
GLUCOSE SERPL-MCNC: 65 MG/DL (ref 65–99)
HDLC SERPL-MCNC: 122 MG/DL (ref 40–60)
LDLC SERPL CALC-MCNC: 143 MG/DL (ref 0–100)
LDLC/HDLC SERPL: 1.15 {RATIO}
POTASSIUM SERPL-SCNC: 3.7 MMOL/L (ref 3.5–5.2)
PROT SERPL-MCNC: 7.8 G/DL (ref 6–8.5)
SODIUM SERPL-SCNC: 140 MMOL/L (ref 136–145)
TRIGL SERPL-MCNC: 56 MG/DL (ref 0–150)
VLDLC SERPL-MCNC: 8 MG/DL (ref 5–40)

## 2024-06-17 RX ORDER — ROSUVASTATIN CALCIUM 10 MG/1
10 TABLET, COATED ORAL DAILY
Qty: 90 TABLET | Refills: 1 | Status: SHIPPED | OUTPATIENT
Start: 2024-06-17

## 2025-01-13 RX ORDER — ROSUVASTATIN CALCIUM 10 MG/1
10 TABLET, COATED ORAL DAILY
Qty: 30 TABLET | Refills: 1 | Status: SHIPPED | OUTPATIENT
Start: 2025-01-13

## 2025-03-26 RX ORDER — ROSUVASTATIN CALCIUM 10 MG/1
10 TABLET, COATED ORAL DAILY
Qty: 30 TABLET | Refills: 5 | Status: SHIPPED | OUTPATIENT
Start: 2025-03-26

## 2025-03-28 RX ORDER — VILAZODONE HYDROCHLORIDE 20 MG/1
20 TABLET ORAL DAILY
Qty: 30 TABLET | Refills: 0 | Status: SHIPPED | OUTPATIENT
Start: 2025-03-28

## 2025-04-10 DIAGNOSIS — Z12.11 COLON CANCER SCREENING: Primary | ICD-10-CM

## 2025-05-01 RX ORDER — VILAZODONE HYDROCHLORIDE 20 MG/1
20 TABLET ORAL DAILY
Qty: 30 TABLET | Refills: 0 | Status: SHIPPED | OUTPATIENT
Start: 2025-05-01

## 2025-06-05 RX ORDER — VILAZODONE HYDROCHLORIDE 20 MG/1
20 TABLET ORAL DAILY
Qty: 30 TABLET | Refills: 0 | Status: SHIPPED | OUTPATIENT
Start: 2025-06-05

## 2025-06-20 ENCOUNTER — OFFICE VISIT (OUTPATIENT)
Dept: FAMILY MEDICINE CLINIC | Facility: CLINIC | Age: 50
End: 2025-06-20
Payer: COMMERCIAL

## 2025-06-20 ENCOUNTER — LAB (OUTPATIENT)
Dept: FAMILY MEDICINE CLINIC | Facility: CLINIC | Age: 50
End: 2025-06-20
Payer: COMMERCIAL

## 2025-06-20 VITALS
WEIGHT: 137 LBS | DIASTOLIC BLOOD PRESSURE: 84 MMHG | HEIGHT: 62 IN | HEART RATE: 76 BPM | SYSTOLIC BLOOD PRESSURE: 133 MMHG | BODY MASS INDEX: 25.21 KG/M2 | TEMPERATURE: 98.1 F | OXYGEN SATURATION: 98 %

## 2025-06-20 DIAGNOSIS — Z00.00 WELLNESS EXAMINATION: Primary | ICD-10-CM

## 2025-06-20 DIAGNOSIS — Z23 NEED FOR SHINGLES VACCINE: ICD-10-CM

## 2025-06-20 DIAGNOSIS — Z00.00 WELLNESS EXAMINATION: ICD-10-CM

## 2025-06-20 DIAGNOSIS — Z12.31 ENCOUNTER FOR SCREENING MAMMOGRAM FOR MALIGNANT NEOPLASM OF BREAST: ICD-10-CM

## 2025-06-20 LAB
ALBUMIN SERPL-MCNC: 4.5 G/DL (ref 3.5–5.2)
ALBUMIN/GLOB SERPL: 1.6 G/DL
ALP SERPL-CCNC: 65 U/L (ref 39–117)
ALT SERPL W P-5'-P-CCNC: 12 U/L (ref 1–33)
ANION GAP SERPL CALCULATED.3IONS-SCNC: 11.3 MMOL/L (ref 5–15)
AST SERPL-CCNC: 17 U/L (ref 1–32)
BASOPHILS # BLD AUTO: 0.02 10*3/MM3 (ref 0–0.2)
BASOPHILS NFR BLD AUTO: 0.4 % (ref 0–1.5)
BILIRUB SERPL-MCNC: 0.8 MG/DL (ref 0–1.2)
BUN SERPL-MCNC: 17 MG/DL (ref 6–20)
BUN/CREAT SERPL: 20.5 (ref 7–25)
CALCIUM SPEC-SCNC: 9.3 MG/DL (ref 8.6–10.5)
CHLORIDE SERPL-SCNC: 103 MMOL/L (ref 98–107)
CHOLEST SERPL-MCNC: 197 MG/DL (ref 0–200)
CO2 SERPL-SCNC: 27.7 MMOL/L (ref 22–29)
CREAT SERPL-MCNC: 0.83 MG/DL (ref 0.57–1)
DEPRECATED RDW RBC AUTO: 43.7 FL (ref 37–54)
EGFRCR SERPLBLD CKD-EPI 2021: 86 ML/MIN/1.73
EOSINOPHIL # BLD AUTO: 0.17 10*3/MM3 (ref 0–0.4)
EOSINOPHIL NFR BLD AUTO: 3.5 % (ref 0.3–6.2)
ERYTHROCYTE [DISTWIDTH] IN BLOOD BY AUTOMATED COUNT: 13.1 % (ref 12.3–15.4)
FSH SERPL-ACNC: 127 MIU/ML
GLOBULIN UR ELPH-MCNC: 2.9 GM/DL
GLUCOSE SERPL-MCNC: 84 MG/DL (ref 65–99)
HCT VFR BLD AUTO: 38.9 % (ref 34–46.6)
HDLC SERPL-MCNC: 89 MG/DL (ref 40–60)
HGB BLD-MCNC: 12.7 G/DL (ref 12–15.9)
IMM GRANULOCYTES # BLD AUTO: 0.01 10*3/MM3 (ref 0–0.05)
IMM GRANULOCYTES NFR BLD AUTO: 0.2 % (ref 0–0.5)
LDLC SERPL CALC-MCNC: 96 MG/DL (ref 0–100)
LDLC/HDLC SERPL: 1.07 {RATIO}
LH SERPL-ACNC: 66.5 MIU/ML
LYMPHOCYTES # BLD AUTO: 1.32 10*3/MM3 (ref 0.7–3.1)
LYMPHOCYTES NFR BLD AUTO: 27.4 % (ref 19.6–45.3)
MCH RBC QN AUTO: 29.9 PG (ref 26.6–33)
MCHC RBC AUTO-ENTMCNC: 32.6 G/DL (ref 31.5–35.7)
MCV RBC AUTO: 91.5 FL (ref 79–97)
MONOCYTES # BLD AUTO: 0.37 10*3/MM3 (ref 0.1–0.9)
MONOCYTES NFR BLD AUTO: 7.7 % (ref 5–12)
NEUTROPHILS NFR BLD AUTO: 2.93 10*3/MM3 (ref 1.7–7)
NEUTROPHILS NFR BLD AUTO: 60.8 % (ref 42.7–76)
NRBC BLD AUTO-RTO: 0 /100 WBC (ref 0–0.2)
PLATELET # BLD AUTO: 222 10*3/MM3 (ref 140–450)
PMV BLD AUTO: 10.2 FL (ref 6–12)
POTASSIUM SERPL-SCNC: 4.2 MMOL/L (ref 3.5–5.2)
PROT SERPL-MCNC: 7.4 G/DL (ref 6–8.5)
RBC # BLD AUTO: 4.25 10*6/MM3 (ref 3.77–5.28)
SODIUM SERPL-SCNC: 142 MMOL/L (ref 136–145)
TRIGL SERPL-MCNC: 63 MG/DL (ref 0–150)
TSH SERPL DL<=0.05 MIU/L-ACNC: 1.26 UIU/ML (ref 0.27–4.2)
VLDLC SERPL-MCNC: 12 MG/DL (ref 5–40)
WBC NRBC COR # BLD AUTO: 4.82 10*3/MM3 (ref 3.4–10.8)

## 2025-06-20 PROCEDURE — 83002 ASSAY OF GONADOTROPIN (LH): CPT | Performed by: FAMILY MEDICINE

## 2025-06-20 PROCEDURE — 80061 LIPID PANEL: CPT | Performed by: FAMILY MEDICINE

## 2025-06-20 PROCEDURE — 83001 ASSAY OF GONADOTROPIN (FSH): CPT | Performed by: FAMILY MEDICINE

## 2025-06-20 PROCEDURE — 80050 GENERAL HEALTH PANEL: CPT | Performed by: FAMILY MEDICINE

## 2025-06-20 PROCEDURE — 36415 COLL VENOUS BLD VENIPUNCTURE: CPT | Performed by: FAMILY MEDICINE

## 2025-06-20 RX ORDER — VALACYCLOVIR HYDROCHLORIDE 500 MG/1
500 TABLET, FILM COATED ORAL DAILY
Qty: 90 TABLET | Refills: 0 | Status: SHIPPED | OUTPATIENT
Start: 2025-06-20

## 2025-06-20 RX ORDER — VILAZODONE HYDROCHLORIDE 20 MG/1
20 TABLET ORAL DAILY
Qty: 90 TABLET | Refills: 3 | Status: SHIPPED | OUTPATIENT
Start: 2025-06-20

## 2025-06-20 RX ORDER — CETIRIZINE HYDROCHLORIDE 10 MG/1
TABLET ORAL
COMMUNITY

## 2025-06-20 NOTE — PROGRESS NOTES
Injection  Injection performed in LEFT DELTOID by Benjamin Francis MA. Patient tolerated the procedure well without complications.    Patient Supplied: NO    SHINGRIX   NDC: 01066-415-55  LOT: [id number]  EXP: 3/30/2027    SHINGRIX RECONSTITUTION  NDC: 79662-290-86  LOT: [id number]  EXP: 3/30/2027    Benjamin Francis MA  06/20/25, 14:53 EDT

## 2025-06-20 NOTE — PROGRESS NOTES
"Subjective   Tanisha Mcmillan is a 49 y.o. female and is here for a comprehensive physical exam. The patient reports problems - hot flashes, mood swings, sweats, weight gain.  She has lost both of her grandmothers recently.    Do you take any herbs or supplements that were not prescribed by a doctor? no  Are you taking calcium supplements? no  Are you taking aspirin daily? no    The following portions of the patient's history were reviewed and updated as appropriate: allergies, current medications, past family history, past medical history, past social history, past surgical history, and problem list.    Review of Systems  Do you have pain that bothers you in your daily life? no  Pertinent items are noted in HPI.    Objective   /84 (BP Location: Left arm, Patient Position: Sitting, Cuff Size: Adult)   Pulse 76   Temp 98.1 °F (36.7 °C) (Temporal)   Ht 157.5 cm (62\")   Wt 62.1 kg (137 lb)   SpO2 98%   BMI 25.06 kg/m²   General appearance: alert, appears stated age, and cooperative  Head: Normocephalic, without obvious abnormality, atraumatic  Eyes: conjunctivae/corneas clear. PERRL, EOM's intact. Fundi benign.  Ears: normal TM's and external ear canals both ears  Throat: lips, mucosa, and tongue normal; teeth and gums normal  Neck: no adenopathy, no JVD, supple, symmetrical, trachea midline, and thyroid not enlarged, symmetric, no tenderness/mass/nodules  Lungs: clear to auscultation bilaterally  Heart: regular rate and rhythm, S1, S2 normal, no murmur, click, rub or gallop  Abdomen: soft, non-tender; bowel sounds normal; no masses,  no organomegaly  Extremities: extremities normal, atraumatic, no cyanosis or edema  Pulses: 2+ and symmetric  Skin: Skin color, texture, turgor normal. No rashes or lesions  Lymph nodes: Cervical, supraclavicular, and axillary nodes normal.  Neurologic: Grossly normal     No visits with results within 1 Week(s) from this visit.   Latest known visit with results is:   Results " Encounter on 04/11/2025   Component Date Value Ref Range Status    Cologuard 04/29/2025 Sample Could Not Be Processed 7  N/A Final    The Cologuard (TM) test was assigned to this specimen.   The sample stability limit had been exceeded. The patient will be contacted to initiate a new sample collection.    Cologuard 05/19/2025 Negative  Negative Final    Comment: The Cologuard (TM) test was performed on this specimen.    NEGATIVE TEST RESULT. A negative Cologuard result indicates a low likelihood that a colorectal cancer (CRC) or advanced adenoma (adenomatous polyps with more advanced pre-malignant features) is present. The chance that a person with a negative Cologuard test has a colorectal cancer is less than 1 in 1500 (negative predictive value >99.9%) or has an advanced adenoma is less than 5.3% (negative predictive value 94.7%). These data are based on a prospective cross-sectional study of 10,000 individuals at average risk for colorectal cancer who were screened with both Cologuard and colonoscopy. (Bobby SCHWAB et al, N Engl J Med 2014;370(14):6549-3659) The normal value (reference range) for this assay is negative.    COLOGUARD RE-SCREENING RECOMMENDATION: Periodic colorectal cancer screening is an important part of preventive healthcare for asymptomatic individuals at average risk for colorectal cancer. Following a negative Cologuard                            result, the American Cancer Society and U.S. Multi-Society Task Force screening guidelines recommend a Cologuard re-screening interval of 3 years.   References: American Cancer Society Guideline for Colorectal Cancer Screening: https://www.cancer.org/cancer/colon-rectal-cancer/vfywvxhxx-nivjuqwph-rtcnqsu/acs-recommendations.html.; Shad HIGHTOWER, Kaelyn MEDRANO, Yeyo CABALLERO, Colorectal Cancer Screening: Recommendations for Physicians and Patients from the U.S. Multi-Society Task Force on Colorectal Cancer Screening , Am J Gastroenterology 2017;  112:7521-1539.    TEST DESCRIPTION: Composite algorithmic analysis of stool DNA-biomarkers with hemoglobin immunoassay.   Quantitative values of individual biomarkers are not reportable and are not associated with individual biomarker result reference ranges. Cologuard is intended for colorectal cancer screening of adults of either sex, 45 years or older, who are at average-risk for colorectal cancer (CRC). Cologuard has been approved for use by the [place]                            FDA. The performance of Cologuard was established in a cross sectional study of average-risk adults aged 50-84. Cologuard performance in patients ages 45 to 49 years was estimated by sub-group analysis of near-age groups. Colonoscopies performed for a positive result may find as the most clinically significant lesion: colorectal cancer [4.0%], advanced adenoma (including sessile serrated polyps greater than or equal to 1cm diameter) [20%] or non- advanced adenoma [31%]; or no colorectal neoplasia [45%]. These estimates are derived from a prospective cross-sectional screening study of 10,000 individuals at average risk for colorectal cancer who were screened with both Cologuard and colonoscopy. (Bobby SCHWAB et al, N Engl J Med 2014;370(14):4346-6616.) Cologuard may produce a false negative or false positive result (no colorectal cancer or precancerous polyp present at colonoscopy follow up). A negative Cologuard test result does not guarantee the absence of CRC or advanced adenoma                            (pre-cancer). The current Cologuard screening interval is every 3 years. (American Cancer Society and U.S. Multi-Society Task Force). Cologuard performance data in a 10,000 patient pivotal study using colonoscopy as the reference method can be accessed at the following location: [email or web address]. Additional description of the Cologuard test process, warnings and precautions can be found at [email or web address].         Assessment  & Plan   Healthy female exam.   Diagnoses and all orders for this visit:    1. Wellness examination (Primary)  -     FSH & LH; Future  -     TSH  -     CBC & Differential  -     Comprehensive Metabolic Panel  -     Lipid Panel    2. Encounter for screening mammogram for malignant neoplasm of breast  -     Mammo Screening Digital Tomosynthesis Bilateral With CAD; Future    3. Need for shingles vaccine  -     Shingrix Vaccine    Other orders  -     vilazodone (VIIBRYD) 20 MG tablet tablet; Take 1 tablet by mouth Daily.  Dispense: 90 tablet; Refill: 3  -     valACYclovir (VALTREX) 500 MG tablet; Take 1 tablet by mouth Daily.  Dispense: 90 tablet; Refill: 0      1. Well exam.   2. Patient Counseling:  --Nutrition: Stressed importance of moderation in sodium/caffeine intake, saturated fat and cholesterol, caloric balance, sufficient intake of fresh fruits, vegetables, fiber, calcium, iron  --Exercise: Stressed the importance of regular exercise.    --Dental health: Discussed importance of regular tooth brushing, flossing, and dental visits.  --Immunizations reviewed.  --Discussed benefits of screening colonoscopy.    3. Discussed the patient's BMI with her.  The BMI is in the acceptable range  4. Follow up in one year

## 2025-06-23 ENCOUNTER — RESULTS FOLLOW-UP (OUTPATIENT)
Dept: FAMILY MEDICINE CLINIC | Facility: CLINIC | Age: 50
End: 2025-06-23
Payer: COMMERCIAL

## 2025-06-23 DIAGNOSIS — N95.1 MENOPAUSAL SYNDROME (HOT FLASHES): Primary | ICD-10-CM

## 2025-06-24 RX ORDER — ESTROGEN,CON/M-PROGEST ACET 0.45-1.5MG
1 TABLET ORAL DAILY
Qty: 90 TABLET | Refills: 1 | Status: SHIPPED | OUTPATIENT
Start: 2025-06-24

## 2025-08-22 ENCOUNTER — CLINICAL SUPPORT (OUTPATIENT)
Dept: FAMILY MEDICINE CLINIC | Facility: CLINIC | Age: 50
End: 2025-08-22
Payer: COMMERCIAL

## 2025-08-22 DIAGNOSIS — Z23 NEED FOR SHINGLES VACCINE: Primary | ICD-10-CM

## 2025-08-22 PROCEDURE — 90750 HZV VACC RECOMBINANT IM: CPT | Performed by: FAMILY MEDICINE
